# Patient Record
Sex: MALE | URBAN - METROPOLITAN AREA
[De-identification: names, ages, dates, MRNs, and addresses within clinical notes are randomized per-mention and may not be internally consistent; named-entity substitution may affect disease eponyms.]

---

## 2023-07-17 ENCOUNTER — TELEPHONE (OUTPATIENT)
Dept: NEUROSURGERY | Facility: CLINIC | Age: 23
End: 2023-07-17

## 2023-07-17 NOTE — TELEPHONE ENCOUNTER
7/17/23 RECEIVED EMAIL FROM PT REQUESTING APPT; First Name: Corinne Sarin  Last Name: Giovany Dennis  YOB: 2000  Email: [email or web address]  Phone: [phone]   Address: [id number] [place] [address] Street: [address] [place] [address] Sw: [place]  Zip: [address]  What are Your Symptoms?: Right sided numbness that has been slowly progressing to left side. Recently received 5 days of IV steroids. Do you have a neuro specialist?: Yes  If yes, who is your doctor?:     Shala Arriaza SPOKE TO PT, HE STATES HE HAD RECENT IMAGING DONE AT [address] [place] [address] REPORT FAXED AND OBTAIN [address] [place] [address]. PT [facility] Elmer Arriaza [id number] [phone]. ADVISED PT WE WOULD CALL BACK FOR INTAKE ONCE IMAGING REPORT RECEIVED.

## 2023-07-25 NOTE — TELEPHONE ENCOUNTER
7/25/23 REPORTS RECEIVED AND SCANNED INTO CHART, BOOKMARKED, AND PT HAS DISC FOR APPT. MRI BRAIN 7/6/23 SANCHEZ  MRI CSPINE 7/6/23 SANCHEZ  LUMBAR PUNCTURE 7/7/23 SANCHEZ  MRI TSPINE 7/7/23 DANIEL  CALLED AND SPOKE TO PT TO CLARIFY REASON FOR APPT REQUEST, PT STATES HE WAS LOOKING TO SCHEDULE WITH A  NEUROLOGIST, PROVIDED PT WITH NUMBER TO  NEUROLOGY  AND ADVISED I WOULD SEND MSG TO STAFF.

## 2023-10-05 ENCOUNTER — TELEPHONE (OUTPATIENT)
Dept: NEUROLOGY | Facility: CLINIC | Age: 23
End: 2023-10-05

## 2023-10-05 ENCOUNTER — OFFICE VISIT (OUTPATIENT)
Dept: NEUROLOGY | Facility: CLINIC | Age: 23
End: 2023-10-05
Payer: COMMERCIAL

## 2023-10-05 ENCOUNTER — APPOINTMENT (OUTPATIENT)
Dept: LAB | Facility: CLINIC | Age: 23
End: 2023-10-05
Payer: COMMERCIAL

## 2023-10-05 VITALS
RESPIRATION RATE: 20 BRPM | HEART RATE: 66 BPM | SYSTOLIC BLOOD PRESSURE: 110 MMHG | WEIGHT: 294 LBS | DIASTOLIC BLOOD PRESSURE: 82 MMHG | HEIGHT: 73 IN | TEMPERATURE: 97.5 F | OXYGEN SATURATION: 98 % | BODY MASS INDEX: 38.97 KG/M2

## 2023-10-05 DIAGNOSIS — G37.9 CNS DEMYELINATION (HCC): Primary | ICD-10-CM

## 2023-10-05 DIAGNOSIS — R20.2 NUMBNESS AND TINGLING IN RIGHT HAND: ICD-10-CM

## 2023-10-05 DIAGNOSIS — G35 MULTIPLE SCLEROSIS (HCC): Primary | ICD-10-CM

## 2023-10-05 DIAGNOSIS — R20.0 NUMBNESS AND TINGLING IN RIGHT HAND: ICD-10-CM

## 2023-10-05 DIAGNOSIS — G35 MS (MULTIPLE SCLEROSIS) (HCC): ICD-10-CM

## 2023-10-05 DIAGNOSIS — N31.9 NEUROGENIC BLADDER: ICD-10-CM

## 2023-10-05 LAB
HBV CORE AB SER QL: NORMAL
HBV SURFACE AB SER-ACNC: <3 MIU/ML
HBV SURFACE AG SER QL: NORMAL
IGA SERPL-MCNC: 242 MG/DL (ref 66–433)
IGG SERPL-MCNC: 1255 MG/DL (ref 635–1741)
IGM SERPL-MCNC: 75 MG/DL (ref 45–281)

## 2023-10-05 PROCEDURE — 99245 OFF/OP CONSLTJ NEW/EST HI 55: CPT | Performed by: PSYCHIATRY & NEUROLOGY

## 2023-10-05 PROCEDURE — 87522 HEPATITIS C REVRS TRNSCRPJ: CPT

## 2023-10-05 PROCEDURE — 87340 HEPATITIS B SURFACE AG IA: CPT

## 2023-10-05 PROCEDURE — 36415 COLL VENOUS BLD VENIPUNCTURE: CPT

## 2023-10-05 PROCEDURE — 86480 TB TEST CELL IMMUN MEASURE: CPT

## 2023-10-05 PROCEDURE — 87389 HIV-1 AG W/HIV-1&-2 AB AG IA: CPT

## 2023-10-05 PROCEDURE — 86706 HEP B SURFACE ANTIBODY: CPT

## 2023-10-05 PROCEDURE — 86363 MOG-IGG1 ANTB FLO CYTMTRY EA: CPT | Performed by: PSYCHIATRY & NEUROLOGY

## 2023-10-05 PROCEDURE — 86053 AQAPRN-4 ANTB FLO CYTMTRY EA: CPT

## 2023-10-05 PROCEDURE — 86711 JOHN CUNNINGHAM ANTIBODY: CPT

## 2023-10-05 PROCEDURE — 86704 HEP B CORE ANTIBODY TOTAL: CPT

## 2023-10-05 PROCEDURE — 82784 ASSAY IGA/IGD/IGG/IGM EACH: CPT

## 2023-10-05 RX ORDER — GABAPENTIN 100 MG/1
100 CAPSULE ORAL AS NEEDED
COMMUNITY
Start: 2023-07-14
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
## 2023-10-05 NOTE — PROGRESS NOTES
Review of Systems   Constitutional: Positive for fatigue (sometimes sleep 12+ hrs and still feels tired). Negative for appetite change and fever. HENT: Negative. Negative for hearing loss, tinnitus, trouble swallowing and voice change. Eyes: Negative. Negative for photophobia, pain and visual disturbance. Respiratory: Negative. Negative for shortness of breath. Cardiovascular: Negative. Negative for palpitations. Gastrointestinal: Negative. Negative for nausea and vomiting. Endocrine: Negative. Negative for cold intolerance. Genitourinary: Negative. Negative for dysuria, frequency and urgency. Musculoskeletal: Positive for gait problem (issues with balance - T25FW - 8SEC). Negative for back pain, myalgias and neck pain. Skin: Negative. Negative for rash. Allergic/Immunologic: Negative. Neurological: Positive for dizziness (randomly has dizziness - has not happened recently ), numbness (right arm/leg) and headaches (ONCE A WEEK ). Negative for tremors, seizures, syncope, facial asymmetry, speech difficulty, weakness and light-headedness. Hematological: Negative. Does not bruise/bleed easily. Psychiatric/Behavioral: Positive for confusion (sometimes ). Negative for hallucinations and sleep disturbance.
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
## 2023-10-06 ENCOUNTER — TELEPHONE (OUTPATIENT)
Dept: NEUROLOGY | Facility: CLINIC | Age: 23
End: 2023-10-06
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
## 2023-10-06 LAB
GAMMA INTERFERON BACKGROUND BLD IA-ACNC: 0.01 IU/ML
HCV RNA SERPL NAA+PROBE-ACNC: NORMAL IU/ML
HIV 1+2 AB+HIV1 P24 AG SERPL QL IA: NORMAL
HIV 2 AB SERPL QL IA: NORMAL
HIV1 AB SERPL QL IA: NORMAL
HIV1 P24 AG SERPL QL IA: NORMAL
M TB IFN-G BLD-IMP: NEGATIVE
M TB IFN-G CD4+ BCKGRND COR BLD-ACNC: 0 IU/ML
M TB IFN-G CD4+ BCKGRND COR BLD-ACNC: 0.01 IU/ML
MITOGEN IGNF BCKGRD COR BLD-ACNC: 9.99 IU/ML
TEST INFORMATION: NORMAL
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
## 2023-10-06 NOTE — LETTER
FACSIMILE TRANSMITTAL SHEET  to: [facility]  from: MIRACLE Ley          company:   date: 10/13/2023          RECIPIENT's fax number: [phone]  total no. of pages including cover:           RECIPIENT'S Phone number: ( )   sender's FAX number:           rE: Consult request  SENDER'S PHONE number: [phone]           [] Urgent  [x] For Review[] Please Comment [] Please Reply   notes/Comments:      Please review referral, could you please contact patient to schedule consult? Thank you                CONFIDENTIALITY NOTICE  This transmission is intended only for the use of the individual or entity to which it is addressed and may contain information that is privileged and confidential.  If the reader of this message is not the intended recipient, you are hereby notified that any disclosure, distribution, or copying of this information is strictly prohibited.   If you have received this transmission in error, please notify us immediately by telephone, and return the original documents to us at the above address via the [place]

## 2023-10-06 NOTE — TELEPHONE ENCOUNTER
MIRACLE phoned [address]   [address] [place] [address] Kahlil Gonsalves  Phone  [phone]  They ask their surgeons on Monday to learn if they can help patient and then will be calling this writer back.

## 2023-10-06 NOTE — TELEPHONE ENCOUNTER
Addressing surgery question in other encounter. Will f/u on Ocrevus start. Awaiting BI and final lab results.
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
## 2023-10-06 NOTE — TELEPHONE ENCOUNTER
, is there a specific type of surgeon you would recommend? Plastic surgery, general surgery, etc?     SW- are you able to assist pt in locating surgeon? Thanks!

## 2023-10-06 NOTE — LETTER
FACSIMILE TRANSMITTAL SHEET  to: [facility]  from: Rigoberto Lane, [address]:   date: 10/12/2023          RECIPIENT's fax number: [phone]  total no. of pages including cover:           RECIPIENT'S Phone number: ( )   sender's FAX number:           rE: consult  SENDER'S PHONE number: [phone]           [] Urgent  x For Review[] Please Comment [] Please Reply   notes/Comments:    Good afternoon,    Please review referral for Mr. Solis Patch [phone] requesting a consult. Thanks  Rigoberto Lane                   CONFIDENTIALITY NOTICE  This transmission is intended only for the use of the individual or entity to which it is addressed and may contain information that is privileged and confidential.  If the reader of this message is not the intended recipient, you are hereby notified that any disclosure, distribution, or copying of this information is strictly prohibited.   If you have received this transmission in error, please notify us immediately by telephone, and return the original documents to us at the above address via the [facility]
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
## 2023-10-10 NOTE — TELEPHONE ENCOUNTER
MSW phoned [address]   [facility], [facility]  Phone  [phone]  Spoke with [employer] who informed that   Jose lutz get back to this writer regarding request       MSLING had communication with Stephanie. [facility] can see pt.  They do not need a referral. Pt can call for an eval.

## 2023-10-10 NOTE — TELEPHONE ENCOUNTER
Incoming call from [address] [place] [address],  [phone]  Dr. Yenifer Nelson cannot do the surgery.

## 2023-10-11 NOTE — TELEPHONE ENCOUNTER
MSW phoned pt and he informed that he would like to know more info about how much his insurance will cover and how much is his responsibility. MSW will reach out to [address] regarding patient's question.

## 2023-10-12 NOTE — TELEPHONE ENCOUNTER
Referral faxed to [facility]   Referrals, ovn and facesheet

## 2023-10-12 NOTE — TELEPHONE ENCOUNTER
MSW phoned [address]   [address] [facility] Kahlil Gonsalves  Phone  [phone]  -spoke with Carmen Wood who shared that before they can bill patient's insurance they would have to do an initial evaluation with patient. This evaluation they would try to bill insurance and it is considered a specialty visit. If insurance does not cover the visit, then the out of pocket cost is $150. Once initial evaluation is completed, their team will reach out to insurance to obtain a quote and will reach out to patient. [id number] [place] [address] Way are located in [place] [address]. MSW phoned pt and provided this information. He also would like to know if there are other offices that are closer to his home before selecting Qiana Metzger. MSW will find other locations and will reach out to patient.
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
## 2023-10-13 NOTE — TELEPHONE ENCOUNTER
Fax to [facility] did not go through     Rosalia: One [facility], [facility], [address] [place] [address] 3  Hours: Open ? Closes 7? PM  Phone: [phone]      - MSW call to request new fax number   Fax [phone] alternative fax provided   [phone]    Referral refaxed

## 2023-10-16 LAB
GALACTOMANNAN AG SERPL IA-ACNC: 1.26
JCPYV AB SERPL QL IA: ABNORMAL
JCPYV AB SERPL QL IA: POSITIVE
SPECIMEN STATUS: ABNORMAL

## 2023-10-17 LAB — MISCELLANEOUS LAB TEST RESULT: NORMAL

## 2023-10-19 NOTE — TELEPHONE ENCOUNTER
All lab results received. BI is in process. Per [employer] portal, additional information is needed. Sent message to this team to determine what is needed. Will f/u.

## 2023-10-20 NOTE — TELEPHONE ENCOUNTER
Received response in [employer] portal:    "Dawit Garcia, we need the patients insurance information. Plan came in as [facility], and rep was unable to locate patient also stated prefix in Sub ID is for [place]. [place] also unable to locate patient -Thank you. - Angelica"    Provided copy of pt's insurance card. Coverage e-verified in epic as of earlier this month. Awaiting update.

## 2023-10-27 PROBLEM — G35 MULTIPLE SCLEROSIS (HCC): Status: ACTIVE | Noted: 2023-10-05

## 2023-10-27 NOTE — TELEPHONE ENCOUNTER
Therapy plan entered and sent for signature. If PA is approved, pt just needs scheduling. Awaiting determination.

## 2023-10-27 NOTE — TELEPHONE ENCOUNTER
Working on entering therapy plan. Please add diagnosis of MS to pt's chart for billing purposes. Thank you! Still awaiting PA determination.

## 2023-10-30 DIAGNOSIS — G35 MULTIPLE SCLEROSIS (HCC): Primary | ICD-10-CM

## 2023-10-30 RX ORDER — SODIUM CHLORIDE 9 MG/ML
20 INJECTION, SOLUTION INTRAVENOUS ONCE
OUTPATIENT
Start: 2023-11-15

## 2023-10-30 RX ORDER — ACETAMINOPHEN 325 MG/1
650 TABLET ORAL ONCE
OUTPATIENT
Start: 2023-11-15

## 2023-11-09 NOTE — TELEPHONE ENCOUNTER
Therapy plan is signed. Received fax from [facility]- request is managed by [employer]. Request to be faxed to [phone]. Phone # [phone]. Request faxed.  Awaiting determination

## 2023-11-16 NOTE — TELEPHONE ENCOUNTER
Called [employer] at [phone] and spoke with Pacov. She reports Ocrevus PA is still under review. Review may take up to 15 days. States nothing further is needed from our office. Awaiting determination.

## 2023-11-16 NOTE — TELEPHONE ENCOUNTER
Jaun Amos [facility])    Edited November 8, 2023 at 1:17 PM  @Radha Valencia (Customer) Diana Monzon, as a follow up to this patient. We have made multiple attempts to reach out to him, but he always hangs up on us. Can someone reach out to him to let him know that we're calling or can you provide his number to us so he can give us a call back? Thank you! Will notify pt to contact [employer]spinela if PA is approved.

## 2023-11-21 NOTE — TELEPHONE ENCOUNTER
Forwarded the following voicemail: [facility], my name is Ignacia. I'm calling with MBM prior authorizations on the line that may be monitored or recorded. This is a message for Radha about patient Dixon Chen and request #[id number] for Yohan Ness. I'm calling because this is a site of care drug. It should be given at a lower level of care, such as an office, freestanding infusion suite or home settings. There are a few options. The 1st being cancelling the request and selecting a different location for treatment. The 2nd option is a peer to peer, which may be scheduled by calling [phone], with the request number. Or the 3rd option, is a standard medical director review. You would have your decision by November 24th. Please call me at [phone], extension [phone]. And let me know how your provider would like to proceed. My voicemail is confidential. So if I miss your call, please leave a detailed message with any questions or update.”    Left detailed message for Igncaia advising pt is not established on Ocrevus- this is a new start. Pt must have initial 2 doses in hospital outpatient setting due to risk of reaction. If tolerated well, can switch site of care for future infusions. Requested medical director review with the provided information.

## 2023-11-24 NOTE — TELEPHONE ENCOUNTER
received vm from 11/21 at 2:18pm-Hi, this is Josiane with MBM authorizations on a line that may be monitored or recorded. Calling back for Pushpa Mandujano about patient Harshad Duggan, date of birth 2000. This is about pending request [id number]. I reached out because this medication is a site of care drug. It should be given at a lower level of care such as an office, freestanding infusion suite or home setting. Your provider has the option to schedule a peer to peer by calling [phone], using that request number. Otherwise I am forwarding it to the medical directors for review and you'll have your decision by november 24th. Um, the other option is to cancel and select a different location for treatment. My callback number is [phone], extension [phone]. Please return my call and let me know how your provider would like to proceed.  Thank you.  ---------------------------------------------  Radha left  for Ignacia after Josiane left this message

## 2023-11-27 NOTE — TELEPHONE ENCOUNTER
VM 11/24/23 at 2:18 pm:    Renetta, this is Josiane from [facility] prior authorizations on the line that may be monitored or recorded. Calling for Marianne Rojas about patient Gilmar Mccracken. Date of birth 2000. Request, [id number] is not approved due to (((inaudible))) of care. This medication may be safely given at an office,freestanding infusions with home studying, essentially just a lower level of care than the outpatient facility provides. Your office will receive a letter with denial information and appeal rights. If you have questions, please call [phone]. There's also the option for a post denial peer to peer which may be completed within the next 14 days. The scheduling phone number is [phone]. And you will need the request number when calling.

## 2023-11-27 NOTE — TELEPHONE ENCOUNTER
Per below, Chris Saenz has been denied due to site of care. Insurance requires pt to go to freestanding site or have home infusion. Pt is new to therapy. Did already explain that initiation of therapy must take place in hospital outpatient setting due to risks of infusion reaction. Denial was still received. Will review appeal options once letter is received. May need to contact plan if letter is not received soon to allow time for peer to peer.

## 2023-11-29 NOTE — TELEPHONE ENCOUNTER
Called TRES at [phone] and spoke with [facility] [place]. She provided phone # [phone] for appeals. Called this number, reached [facility]. Spoke with Tobi Zapata. She reports appeal must be faxed to [phone] attn pharmacy appeals.      Will submit appeal.

## 2023-12-05 ENCOUNTER — TELEPHONE (OUTPATIENT)
Dept: NEUROLOGY | Facility: CLINIC | Age: 23
End: 2023-12-05

## 2023-12-05 NOTE — TELEPHONE ENCOUNTER
Patient was a [facility] for his VV today - please offer in-office visit with me or Fatuma.

## 2023-12-05 NOTE — TELEPHONE ENCOUNTER
Mayra Pena, SW5 days ago     YO     Hi, my name is Guerrero Blackburn. I am Jaret viveros. I know he restarted the process of him getting Ms. treatment for you guys. And then I thought Alicia Rosales was scheduling things and getting things scheduled. He wasn't. So I'm working on getting things scheduled for him. I talked to the plastic surgeons through [address] [place] [address] and I was just kind of reaching out to see what else needed to be done. So if you could give me a call back, my number is [phone]. Thank you.

## 2023-12-05 NOTE — TELEPHONE ENCOUNTER
Called [facility] at [phone] and spoke with Carlito Underwood. He reports appeal has been approved:    Kp Ferrari is approved with [facility] from 11/9/23 to 11/9/24 at \[facility]\"" infusion center. Auth # [id number]. Called \[facility]\"" infusion center and spoke with Zuleyka Hernandez. Scheduled pt for first available Ocrevus appts:    1/4/24 @ 7:30am  1/19/24 @ 7:30am (1 day delay to allow for sooner availability)    Left voicemail for pt's jordan Turner (on communication consent) per below. Called and spoke with pt. Made him aware of approval and scheduled appts. He is agreeable. Did also advise labs would be needed 1 week prior to first infusion. Will contact pt with reminder closer to that date. Provided phone # for [address] so pt can enroll in copay assistance if needed. Pt aware to call back with any questions.

## 2023-12-07 NOTE — TELEPHONE ENCOUNTER
Spoke with Moy Day. Confirmed pt can have home infusion or go to closer free standing infusion site after initial split dose if tolerated well. They would be interested in home infusion- unsure if there are any freestanding infusion centers close to pt. She reports pt will have labs one week prior to infusion at local lab [facility]). She would like lab scripts sent to pt in [facility] message. The following labs are required and have been entered as appropriate per protocol:    CBC  CMP    Immunoglobulins done 10/5/23  HIV done 10/5/23  Quantiferon TB gold done 10/5/23  Hepatitis panel done 10/5/23    New CBC and CMP orders entered so they are available on same script to avoid errors at lab. [employer] message sent.

## 2023-12-07 NOTE — TELEPHONE ENCOUNTER
Received message from [address]:    "Received this message 12/5/23 at 12:41. I was not able to pull up the chart. Hi, my name is Ari Ac. I got a message this morning from [address] [place] [address] regarding my jackeline Chaudhry Plan. And he told me that he got in touch with you guys this morning. But I was just wondering for his Ocrevus infusion. I know when we talked to the doctor, she said about the 1st one being at [facility] and then the following one possibly getting them set up that he could receive them closer to where we live since he live so far away. And I was also wondering about where specifically he needs to get the blood work done on December 28th. So if you could give me a call back and let me know these things. Um, if I don't answer, it's okay to just leave it in my voicemail. I know I am available until like 3 o'clock today. Then I'll be in appointments through work and tomorrow I'll be available after 2. But if you Margarett Epley just leave me a voicemail that works to my phone number is [phone].  Thank you."

## 2023-12-19 ENCOUNTER — TELEPHONE (OUTPATIENT)
Dept: NEUROLOGY | Facility: CLINIC | Age: 23
End: 2023-12-19

## 2023-12-19 DIAGNOSIS — G35 MULTIPLE SCLEROSIS (HCC): Primary | ICD-10-CM

## 2023-12-19 NOTE — TELEPHONE ENCOUNTER
Pt is scheduled for Ocrevus on 1/4/24.     [facility]24 at [facility]. Auth # [id number].     The following labs are required and have been entered as appropriate per protocol:    CBC  CMP    Immunoglobulins done 10/5/23  HIV done 10/5/23  Quantiferon TB gold done 10/5/23  Hepatitis panel done 10/5/23    MyChart message sent to pt reminding them of needed labs.    Awaiting labs.

## 2023-12-21 NOTE — TELEPHONE ENCOUNTER
"Per prior encounter, \"Spoke with Trista. Confirmed pt can have home infusion or go to closer free standing infusion site after initial split dose if tolerated well. They would be interested in home infusion- unsure if there are any freestanding infusion centers close to pt.      She reports pt will have labs one week prior to infusion at local lab ([facility]). She would like lab scripts sent to pt in [facility] message.      The following labs are required and have been entered as appropriate per protocol:     CBC  CMP     Immunoglobulins done 10/5/23  HIV done 10/5/23  Quantiferon TB gold done 10/5/23  Hepatitis panel done 10/5/23     New CBC and CMP orders entered so they are available on same script to avoid errors at lab.     [employer] message sent.\"    Will call with another reminder closer to infusion as [facility] message was not read.  "

## 2023-12-22 ENCOUNTER — TELEPHONE (OUTPATIENT)
Dept: NEUROLOGY | Facility: CLINIC | Age: 23
End: 2023-12-22

## 2023-12-22 NOTE — TELEPHONE ENCOUNTER
VM at 8:30 am:    Hi my name is Trista Carlos. I'm calling regarding my fiance, Hunter Lee. He was just showing me last night his test results from when he got blood work done back in October. 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 So if somebody could give me a call back, I would really appreciate it. And again, my number is [phone]. Thank you.  _____________________________________________________________________________    Trista is listed as a contact on pt's most recent Medical Communication Consent Form. Routed to clinical team to follow up.

## 2023-12-29 NOTE — TELEPHONE ENCOUNTER
CBC and CMP results in chart.     No changes since last office visit.     Okay to proceed with 1st Ocrevus infusion on 1/4/24?

## 2024-01-04 ENCOUNTER — HOSPITAL ENCOUNTER (OUTPATIENT)
Dept: INFUSION CENTER | Facility: HOSPITAL | Age: 24
Discharge: HOME | End: 2024-01-04
Attending: PSYCHIATRY & NEUROLOGY
Payer: COMMERCIAL

## 2024-01-04 ENCOUNTER — TELEPHONE (OUTPATIENT)
Dept: NEUROLOGY | Facility: CLINIC | Age: 24
End: 2024-01-04

## 2024-01-04 VITALS
TEMPERATURE: 97.2 F | RESPIRATION RATE: 18 BRPM | SYSTOLIC BLOOD PRESSURE: 116 MMHG | DIASTOLIC BLOOD PRESSURE: 63 MMHG | HEART RATE: 88 BPM

## 2024-01-04 DIAGNOSIS — G35 MULTIPLE SCLEROSIS (HCC): Primary | ICD-10-CM

## 2024-01-04 PROCEDURE — 96413 CHEMO IV INFUSION 1 HR: CPT

## 2024-01-04 PROCEDURE — 96415 CHEMO IV INFUSION ADDL HR: CPT

## 2024-01-04 PROCEDURE — 96375 TX/PRO/DX INJ NEW DRUG ADDON: CPT

## 2024-01-04 PROCEDURE — 96361 HYDRATE IV INFUSION ADD-ON: CPT

## 2024-01-04 PROCEDURE — 96376 TX/PRO/DX INJ SAME DRUG ADON: CPT

## 2024-01-04 PROCEDURE — 96367 TX/PROPH/DG ADDL SEQ IV INF: CPT

## 2024-01-04 RX ORDER — ACETAMINOPHEN 325 MG/1
650 TABLET ORAL ONCE
OUTPATIENT
Start: 2024-01-19

## 2024-01-04 RX ORDER — SODIUM CHLORIDE 9 MG/ML
20 INJECTION, SOLUTION INTRAVENOUS ONCE
Status: COMPLETED | OUTPATIENT
Start: 2024-01-04 | End: 2024-01-04

## 2024-01-04 RX ORDER — ACETAMINOPHEN 325 MG/1
650 TABLET ORAL ONCE
OUTPATIENT
Start: 2024-01-18

## 2024-01-04 RX ORDER — SODIUM CHLORIDE 9 MG/ML
20 INJECTION, SOLUTION INTRAVENOUS ONCE
OUTPATIENT
Start: 2024-01-18

## 2024-01-04 RX ORDER — METHYLPREDNISOLONE SODIUM SUCCINATE 40 MG/ML
10 INJECTION, POWDER, LYOPHILIZED, FOR SOLUTION INTRAMUSCULAR; INTRAVENOUS
Status: ACTIVE | OUTPATIENT
Start: 2024-01-04 | End: 2024-01-04

## 2024-01-04 RX ORDER — SODIUM CHLORIDE 9 MG/ML
20 INJECTION, SOLUTION INTRAVENOUS ONCE
OUTPATIENT
Start: 2024-01-19

## 2024-01-04 RX ORDER — ACETAMINOPHEN 325 MG/1
650 TABLET ORAL ONCE
Status: COMPLETED | OUTPATIENT
Start: 2024-01-04 | End: 2024-01-04

## 2024-01-04 RX ORDER — FAMOTIDINE 10 MG/ML
20 INJECTION, SOLUTION INTRAVENOUS ONCE
Status: COMPLETED | OUTPATIENT
Start: 2024-01-04 | End: 2024-01-04

## 2024-01-04 RX ORDER — DIPHENHYDRAMINE HYDROCHLORIDE 50 MG/ML
25 INJECTION INTRAMUSCULAR; INTRAVENOUS
Status: ACTIVE | OUTPATIENT
Start: 2024-01-04 | End: 2024-01-04

## 2024-01-04 RX ADMIN — ACETAMINOPHEN 650 MG: 325 TABLET, FILM COATED ORAL at 08:13

## 2024-01-04 RX ADMIN — FAMOTIDINE 20 MG: 10 INJECTION INTRAVENOUS at 07:49

## 2024-01-04 RX ADMIN — SODIUM CHLORIDE 125 MG: 0.9 INJECTION, SOLUTION INTRAVENOUS at 09:00

## 2024-01-04 RX ADMIN — HYDROCORTISONE SODIUM SUCCINATE 100 MG: 100 INJECTION, POWDER, FOR SOLUTION INTRAMUSCULAR; INTRAVENOUS at 08:58

## 2024-01-04 RX ADMIN — FAMOTIDINE 20 MG: 10 INJECTION INTRAVENOUS at 12:27

## 2024-01-04 RX ADMIN — DIPHENHYDRAMINE HYDROCHLORIDE 25 MG: 50 INJECTION, SOLUTION INTRAMUSCULAR; INTRAVENOUS at 12:23

## 2024-01-04 RX ADMIN — SODIUM CHLORIDE 500 ML: 0.9 INJECTION, SOLUTION INTRAVENOUS at 12:22

## 2024-01-04 RX ADMIN — SODIUM CHLORIDE 20 ML/HR: 0.9 INJECTION, SOLUTION INTRAVENOUS at 07:49

## 2024-01-04 RX ADMIN — DIPHENHYDRAMINE HYDROCHLORIDE 50 MG: 50 INJECTION, SOLUTION INTRAMUSCULAR; INTRAVENOUS at 08:13

## 2024-01-04 RX ADMIN — METHYLPREDNISOLONE SODIUM SUCCINATE 10 MG: 40 INJECTION, POWDER, FOR SOLUTION INTRAMUSCULAR; INTRAVENOUS at 12:25

## 2024-01-04 RX ADMIN — OCRELIZUMAB 300 MG: 300 INJECTION INTRAVENOUS at 09:42

## 2024-01-04 NOTE — TELEPHONE ENCOUNTER
01-3-2024, 11:26:08 am EST    Voicemail received from patietns wife Trista regarding labs. Wanted to make sure labs were received and reviewed prior to ocrevus infusion on 1/4/2024 as labs were abnormal.    [phone]

## 2024-01-04 NOTE — TELEPHONE ENCOUNTER
"Received the following messages regarding the patient:    [12:32 PM] Christina Page  he was on the last titration rate of 180ml/hr with 54mL left when he told me he has generalized itching since about 'custodial thru the bag'  [12:33 PM] Christina Page  hypersensitivity kit given....complete resolution of symptoms immediately. can we get an ok to finish?   [12:35 PM] Christina Page  vital signs no different than what they were on arrival    TT sent to Dr. Méndez.     Per Dr. Méndez,\"Yes, please proceed with infusion at a slower rate\"    Confirmed with  infusion to be resumed at 150mL/hr for remainder of infusion.    Order entered in therapy plan.    Infusion RN made aware.   "

## 2024-01-04 NOTE — TELEPHONE ENCOUNTER
December 29, 2023  Radha Perez RN         12/29/23  1:56 PM  Note     CBC and CMP results in chart.      No changes since last office visit.      Okay to proceed with 1st Ocrevus infusion on 1/4/24?             LD    12/29/23  1:56 PM  Radha Perez RN routed this conversation to MD Kenna Garcia MD   to Radha Perez RN       12/29/23  2:36 PM  Note     Patient is cleared for infusion as scheduled - thank you, Radha!

## 2024-01-04 NOTE — PROGRESS NOTES
Infusion complete. Pt c/o generalized itching with 15 minutes left of infusion(which he later admitted actually started half way through the infusion). Infusion stopped and hypersensitivity kit given as reflected in the MAR. Pt had immediate relief and offered no further complaints. VSS. OK to finish infusion order obtained by office at rate of 150ml/hr. Pt completed infusion without further incident. VSS post infusion. 1 hour observation complete.       Hunter Lee is aware of future appt on 1/19/24 at 0730.     AVS Declined by Hunter Lee

## 2024-01-05 ENCOUNTER — TELEPHONE (OUTPATIENT)
Dept: NEUROLOGY | Facility: CLINIC | Age: 24
End: 2024-01-05

## 2024-01-05 NOTE — TELEPHONE ENCOUNTER
Pt is scheduled for 2nd Ocrevus infusion on 1/19/24.      Ocrevus is approved with [facility] from 11/9/23 to 11/9/24 at \[facility]"" infusion center. Auth # [id number].      CBC done 12/28/23  CMP done 12/28/23  Immunoglobulins done 10/5/23  HIV done 10/5/23  Quantiferon TB gold done 10/5/23  Hepatitis panel done 10/5/23     Pt received hypersensitivity kit for generalized itching during first infusion- pt with immediate relief, no further complaints. VSS. Infusion resumed at 150ml/hr. No further reactions.     Okay to proceed with 2nd Ocrevus infusion on 1/19/24?

## 2024-01-19 ENCOUNTER — HOSPITAL ENCOUNTER (OUTPATIENT)
Dept: INFUSION CENTER | Facility: HOSPITAL | Age: 24
Discharge: HOME | End: 2024-01-19
Attending: PSYCHIATRY & NEUROLOGY

## 2024-02-01 ENCOUNTER — HOSPITAL ENCOUNTER (OUTPATIENT)
Dept: INFUSION CENTER | Facility: HOSPITAL | Age: 24
Discharge: HOME | End: 2024-02-01
Attending: PSYCHIATRY & NEUROLOGY
Payer: COMMERCIAL

## 2024-02-01 VITALS
TEMPERATURE: 97.8 F | HEART RATE: 93 BPM | DIASTOLIC BLOOD PRESSURE: 82 MMHG | SYSTOLIC BLOOD PRESSURE: 133 MMHG | RESPIRATION RATE: 18 BRPM

## 2024-02-01 DIAGNOSIS — G35 MULTIPLE SCLEROSIS (HCC): Primary | ICD-10-CM

## 2024-02-01 PROCEDURE — 96413 CHEMO IV INFUSION 1 HR: CPT

## 2024-02-01 PROCEDURE — 96375 TX/PRO/DX INJ NEW DRUG ADDON: CPT

## 2024-02-01 PROCEDURE — 96367 TX/PROPH/DG ADDL SEQ IV INF: CPT

## 2024-02-01 PROCEDURE — 96415 CHEMO IV INFUSION ADDL HR: CPT

## 2024-02-01 RX ORDER — ACETAMINOPHEN 325 MG/1
650 TABLET ORAL ONCE
Status: COMPLETED | OUTPATIENT
Start: 2024-02-01 | End: 2024-02-01

## 2024-02-01 RX ORDER — ACETAMINOPHEN 325 MG/1
650 TABLET ORAL ONCE
OUTPATIENT
Start: 2024-02-15

## 2024-02-01 RX ORDER — ACETAMINOPHEN 325 MG/1
650 TABLET ORAL ONCE
Status: CANCELLED | OUTPATIENT
Start: 2024-02-01

## 2024-02-01 RX ORDER — ACETAMINOPHEN 325 MG/1
650 TABLET ORAL ONCE
Status: CANCELLED | OUTPATIENT
Start: 2024-02-15

## 2024-02-01 RX ORDER — SODIUM CHLORIDE 9 MG/ML
20 INJECTION, SOLUTION INTRAVENOUS ONCE
Status: COMPLETED | OUTPATIENT
Start: 2024-02-01 | End: 2024-02-01

## 2024-02-01 RX ORDER — SODIUM CHLORIDE 9 MG/ML
20 INJECTION, SOLUTION INTRAVENOUS ONCE
OUTPATIENT
Start: 2024-02-15

## 2024-02-01 RX ORDER — SODIUM CHLORIDE 9 MG/ML
20 INJECTION, SOLUTION INTRAVENOUS ONCE
Status: CANCELLED | OUTPATIENT
Start: 2024-02-01

## 2024-02-01 RX ORDER — SODIUM CHLORIDE 9 MG/ML
20 INJECTION, SOLUTION INTRAVENOUS ONCE
Status: CANCELLED | OUTPATIENT
Start: 2024-02-15

## 2024-02-01 RX ADMIN — FAMOTIDINE 20 MG: 10 INJECTION INTRAVENOUS at 09:50

## 2024-02-01 RX ADMIN — HYDROCORTISONE SODIUM SUCCINATE 100 MG: 100 INJECTION, POWDER, FOR SOLUTION INTRAMUSCULAR; INTRAVENOUS at 10:19

## 2024-02-01 RX ADMIN — ACETAMINOPHEN 650 MG: 325 TABLET, FILM COATED ORAL at 10:18

## 2024-02-01 RX ADMIN — DIPHENHYDRAMINE HYDROCHLORIDE 50 MG: 50 INJECTION, SOLUTION INTRAMUSCULAR; INTRAVENOUS at 09:23

## 2024-02-01 RX ADMIN — OCRELIZUMAB 300 MG: 300 INJECTION INTRAVENOUS at 11:26

## 2024-02-01 RX ADMIN — SODIUM CHLORIDE 125 MG: 0.9 INJECTION, SOLUTION INTRAVENOUS at 10:19

## 2024-02-01 RX ADMIN — SODIUM CHLORIDE 20 ML/HR: 0.9 INJECTION, SOLUTION INTRAVENOUS at 09:23

## 2024-02-01 NOTE — PROGRESS NOTES
Hunter Lee  tolerated treatment well with no complications.  Post observation completed.     Hunter Lee is aware of future appt on 7/1/24 at 0730.     AVS declined by Hunter Lee.

## 2024-05-01 ENCOUNTER — PATIENT MESSAGE (OUTPATIENT)
Dept: NEUROLOGY | Facility: CLINIC | Age: 24
End: 2024-05-01

## 2024-05-09 ENCOUNTER — TELEPHONE (OUTPATIENT)
Dept: NEUROLOGY | Facility: CLINIC | Age: 24
End: 2024-05-09

## 2024-05-09 NOTE — TELEPHONE ENCOUNTER
----- Message from Kenna Méndez MD sent at 5/7/2024  7:39 PM EDT -----  Regarding: FW: Next infusion  Contact: [phone]  Yes, we will continue regimen at the other site covered by insurance   ----- Message -----  From: Radha Perez RN  Sent: 5/7/2024   3:57 PM EDT  To: Kenna Méndez MD  Subject: FW: Next infusion                                1st Ocrevus infusion had itching. Tolerated  2nd infusion with no complications. Due 7/1/24. Okay to transition to home infusion/site closer to pt?  ----- Message -----  From: Jennifer Woody RN  Sent: 5/2/2024   8:32 AM EDT  To: Radha Perez RN  Subject: FW: Next infusion                                  ----- Message -----  From: Vicky Posadas RN  Sent: 5/2/2024   6:11 AM EDT  To: Neurology [place] Clinical Team 3  Subject: FW: Next infusion                                  ----- Message -----  From: Hunter Lee  Sent: 5/1/2024   3:11 PM EDT  To: [facility] Clinical  Subject: Next infusion                                    Hello, When we discussed my plan of action for my infusions it was mentioned that after the first infusion I could receive future infusions either at home or at a hospital closer to where I live. I was reaching out to see if that was still an option, and if so what is the process so I can hopefully get that started.   Thank you

## 2024-05-09 NOTE — TELEPHONE ENCOUNTER
Unable to locate any infusion centers close to patient. Could attempt to move forward with [facility] or [facility] home infusion if patient is agreeable.     [employer] message sent back to pt to confirm if he is agreeable to home infusion.     [facility]  [phone] (fax)  [phone] (phone, option 3)    [facility]  P: [phone]  F: [phone]    Infusion order written and awaiting provider signature.

## 2024-05-14 NOTE — TELEPHONE ENCOUNTER
Located phone # [phone] for [facility] ([facility] infusion services. Will call to determine if they accept pts from other networks and if they administer Ocrevus.

## 2024-05-14 NOTE — TELEPHONE ENCOUNTER
May 14, 2024  Hunter Lee   to Me   NM      5/14/24  1:34 PM  I know the closest infusion center to me would be at the [facility] in [facility], but if you are unable to transfer the infusion to the because they are a different hospital system I can do the at home infusions.

## 2024-05-14 NOTE — TELEPHONE ENCOUNTER
Called [facility] at [phone] and spoke with Ivon. Infusion center is closed. Will need to call them at [phone].

## 2024-05-16 NOTE — TELEPHONE ENCOUNTER
Called and spoke with Candy at [facility]. She confirms they do accept infusion patients from outside their network. They do infuse Ocrevus. They handle their own PA requests as well. She will fax orders to [phone]. Awaiting infusion orders for completion.     Update sent to pt via [employer].

## 2024-05-17 NOTE — TELEPHONE ENCOUNTER
Order received and completed/ updated. Uploaded to chart. Please assist with signature. Once signed, please scan into chart.     , please review. Please sign and date each page. Please initial any orders that have been crossed out/modified.     Fax # [phone]    Will send referral once infusion order is signed/initialed.

## 2024-05-23 ENCOUNTER — PATIENT MESSAGE (OUTPATIENT)
Dept: NEUROLOGY | Facility: CLINIC | Age: 24
End: 2024-05-23

## 2024-05-23 NOTE — PATIENT COMMUNICATION
Crystal from [facility] called wants to know why patient needs infusion. She received the orders but no information. She would need the last office notes to call the insurance.  She also states there is another location closer to patient home in [place], [place] about 30 mins away. Please call Crystal with the info.    Crystal- [phone]

## 2024-05-28 NOTE — TELEPHONE ENCOUNTER
Les Finley         5/23/24 10:42 AM  Unsigned Note     Crystal from [facility] center called wants to know why patient needs infusion. She received the orders but no information. She would need the last office notes to call the insurance.  She also states there is another location closer to patient home in [place], [place] about 30 mins away. Please call Crystal with the info.     Crystal- [phone]          Completed infusion referral faxed to [facility]. Will f/u to ensure pt gets scheduled. Due for next infusion 7/1/24.     Pt does need f/u appt as well. [employer] message sent.

## 2024-05-30 NOTE — TELEPHONE ENCOUNTER
Called [facility] at [phone] and spoke with Poonam. Crystal is out of office this week. Will call back.     Staff message sent to scheduling team to assist with f/u appt.

## 2024-07-01 ENCOUNTER — TELEPHONE (OUTPATIENT)
Age: 24
End: 2024-07-01

## 2024-07-01 NOTE — TELEPHONE ENCOUNTER
"--Patient's fiance, Trista called in on behalf of patient. Okay to speak with Trista per communication form. Trista wanted to clarify if there was any update on patient's insurance for his infusion. Pt was to get his infusion today. Stated that the infusion was transferred to an infusion center closer to pt's house. Trista stated that the infusion center said to call once pt received word that the his infusion was approved by insurance.     Per telephone encounter dated 5/9/24, Nurse Navigator contacted Rose:  \"Called [facility] at [phone] and spoke with Yennifer RUSSELL. She reports PA is still under review. Advised multiple calls were made requesting urgent review of PA. Yennifer reached out to nurse assigned to pt's case. She transferred call to nurse Gerber.      She reports she is unable to approve- case was previously approved via appeal. She forwarded to medical director noting prior approval received. She is aware of facility change as well. Determination is due by tomorrow night 8pm.    Will continue to f/u.\"        --Informed Trista of the message above and stated that one of the nurse navigator's will contact her to discuss further. Trista was thankful for the call back.    Please call Trista back at [phone] to discuss, okay per communication form. Pt is currently at work and not able to answer the phone.   "

## 2024-07-02 ENCOUNTER — TELEPHONE (OUTPATIENT)
Dept: NEUROLOGY | Facility: CLINIC | Age: 24
End: 2024-07-02

## 2024-07-02 DIAGNOSIS — Z11.59 ENCOUNTER FOR SCREENING FOR VIRAL DISEASE: ICD-10-CM

## 2024-07-02 DIAGNOSIS — Z11.1 TUBERCULOSIS SCREENING: ICD-10-CM

## 2024-07-02 DIAGNOSIS — Z79.899 IMMUNODEFICIENCY DUE TO TREATMENT WITH IMMUNOSUPPRESSIVE MEDICATION  (HCC): ICD-10-CM

## 2024-07-02 DIAGNOSIS — D84.821 IMMUNODEFICIENCY DUE TO TREATMENT WITH IMMUNOSUPPRESSIVE MEDICATION  (HCC): ICD-10-CM

## 2024-07-02 DIAGNOSIS — G35 MULTIPLE SCLEROSIS (HCC): Primary | ICD-10-CM

## 2024-07-02 NOTE — TELEPHONE ENCOUNTER
Pt is being scheduled for Ocrevus infusion at [facility]. Was due for infusion 7/1/24.      Ocrevus is approved with [employer] via [employer] from 6/28/24 to 6/28/25. Auth # [id number].     The following labs are required and have been entered as appropriate per protocol:    CBC  CMP  Immunoglobulins  HIV  Quantiferon TB gold  Acute hepatitis panel    Left message for Trista to call back (on communication consent).     Left detailed message for pt as well making him aware of needed labs 7 days prior to scheduled infusion appt. Asked for call back to confirm infusion appt.    [employer] message also sent.

## 2024-07-02 NOTE — TELEPHONE ENCOUNTER
Spoke with Trista. Pt is scheduled for Ocrevus infusion 7/9/24. Made her aware of below. She reports pt will have labs completed today or tomorrow [facility].    Called [facility] at [phone]. Obtained fax # [phone]. Faxed lab scripts successfully.     Awaiting lab results.
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
## 2024-07-05 NOTE — TELEPHONE ENCOUNTER
All lab results except quantiferon TB received through care everywhere. Called [facility]- they confirmed Quantiferon TB is still in process.

## 2024-07-05 NOTE — TELEPHONE ENCOUNTER
Received voicemail from Trista stating [facility] did not receive lab orders.   [phone]    Nurse navigator was out of office when voicemail and [employer] message were sent.     Lab scripts uploaded to chart and faxed to lab again. Sent via [employer] as well.

## 2024-07-05 NOTE — TELEPHONE ENCOUNTER
Spoke with Trista. She reports she was able to locate lab scripts on [employer] [facility] [employer]. Pt had labs completed on Wednesday. Waiting for results.

## 2024-07-08 NOTE — TELEPHONE ENCOUNTER
TB is negative.     Please advise if pt is cleared to proceed with Ocrevus infusion tomorrow 7/9/24 at [facility]

## 2024-08-15 ENCOUNTER — TELEPHONE (OUTPATIENT)
Dept: NEUROLOGY | Facility: CLINIC | Age: 24
End: 2024-08-15

## 2024-08-15 ENCOUNTER — TELEMEDICINE (OUTPATIENT)
Dept: NEUROLOGY | Facility: CLINIC | Age: 24
End: 2024-08-15
Payer: COMMERCIAL

## 2024-08-15 DIAGNOSIS — G35 MULTIPLE SCLEROSIS (HCC): Primary | ICD-10-CM

## 2024-08-15 DIAGNOSIS — N31.9 NEUROGENIC BLADDER: ICD-10-CM

## 2024-08-15 PROCEDURE — 99214 OFFICE O/P EST MOD 30 MIN: CPT | Performed by: PSYCHIATRY & NEUROLOGY

## 2024-08-15 NOTE — TELEPHONE ENCOUNTER
Left message for patient to call back and Please schedule 6 months FU with Dr YVONNE carrington VV and 12 months FU  in the office

## 2024-08-15 NOTE — PROGRESS NOTES
Virtual Regular Visit  Name: Hunter Lee      : 2000      MRN: [id number]  Encounter Provider: Kenna Méndez MD  Encounter Date: 8/15/2024   Encounter department: [facility]    Verification of patient location:    Patient is located at Home in the following state in which I hold an active license [place]    Assessment & Plan   1. Multiple sclerosis (HCC)  -     ocrelizumab 300 MG/10ML SOLN; Inject 20 mL (600 mg total) into a catheter in a vein once for 1 dose  -     MRI brain MS wo and w contrast; Future; Expected date: 11/15/2024  -     BUN; Future  -     Creatinine, serum; Future  2. Neurogenic bladder  -     ocrelizumab 300 MG/10ML SOLN; Inject 20 mL (600 mg total) into a catheter in a vein once for 1 dose  -     MRI brain MS wo and w contrast; Future; Expected date: 11/15/2024      Mr. Lee has presented to [facility] multiple sclerosis center for follow-up on multiple sclerosis and related concerns.  Patient was diagnosed with multiple sclerosis in  with no new focal neurological deficit reported since that time as patient has reached clinical stability with great improvement in sensory dysfunction described in his hands patient completed.    Patient completed first split dose of ocrelizumab on  and 2024 and full dose of ocrelizumab followed on 2024.  Patient tolerated medication well with mild allergic reaction described during his initial infusion.    We discussed patient may experiencing pseudo relapse as he is working in hot environment  -patient may apply cool towels or cooling vest which can help the patient to preserve coolness.  Patient taking lots of water and he should use some electrolytes.  Patient drinks green tea and using turmeric pills; patient is to continue vitamin D supplements.    Patient has intermittent lower back pain where he takes gabapentin with excellent outcomes.  Patient may introduce magnesium 400 mg  to his regimen to help with the muscle stiffness in his lower back.  Patient followed with ophthalmology team with no    Signs of breakthrough optic nerve dysfunction advised.  Intermittent balance dysfunction has been noted.  NMO/MOG negative.    Patient describes concentration difficulties and worsening forgetfulness as well as fatigue-patient will be offered MRI brain with without contrast under multiple sclerosis protocol as a follow-up study within the next 2-3 months.  Patient would definitely require a work accommodation and avoid hot environment, patient agreed he is in the middle of his career and he would like to continue what he likes at this point.    Patient is to continue following with [facility] neurology within 6 months, virtual visit advised.    Encounter provider Kenna Méndez MD    The patient was identified by name and date of birth. Hunter Lee was informed that this is a telemedicine visit and that the visit is being conducted through the Epic Embedded platform. He agrees to proceed..  My office door was closed. No one else was in the room.  He acknowledged consent and understanding of privacy and security of the video platform. The patient has agreed to participate and understands they can discontinue the visit at any time.    Patient is aware this is a billable service.     History of Present Illness     Hunter Lee is a 23 y.o. male who presents for follow up on MS   Mr. Lee has presented to [facility] multiple sclerosis center for evaluation.  Patient presented with his fiancée (they have a 12-month-old son).    Since last office visit, patient described gradual improvement in sensory dysfunction in his hands with complete resolution of numbness and tingling in his left hand and only residual sensory dysfunction in his right hand fingertips.  Patient stated he has no significant visual dysfunction, no double vision, no vision loss no significant changes in prescription  glasses.  Patient continue describing fatigue with intermittent disbalance and forgetfulness.  No sensory dysfunction in his face been noted at this time.    Patient was originally hospitalized in July 2023 for sensory dysfunction involving right upper extremity with heaviness and right lower extremity in addition to right side of the face numbness.  Patient received 5 doses of Solu-Medrol 1000 mg with near complete resolution of sensory dysfunction in the face and lower extremity noted.  Patient had split dose completed on January 4, 2024 and February 1, 2024.  Minimal infusion reaction was reported at that time.  Patient did not have any infusion reaction on repeated treatment session on July 1, 2024.  Patient has been receiving his infusion at [facility].       Patient has family history of multiple sclerosis patient's mother carries diagnosis of relapsing remitting multiple sclerosis.     CSF analysis was consistent with greater than 5 oligoclonal bands with normal IgG index, 3 nucleated cells, normal CSF protein.  B12 826; CRP/VIKTOR/ANCA and double-stranded DNA/Lyme disease/acute hepatitis panel all negative.  Mildly elevated ESR 18 with normal/negative rheumatoid factor.      Imaging were completed in outside hospital with no demyelination appreciated in cervical and thoracic region.  Patient brain MRI completed but not reviewed due to difficulties uploading CDs.  Patient has lots of artifacts on his brain MRIs due to BB gun products sitting in patient right pterygoid muscle which causing artifacts.  Main concern radiology was seen as demyelination.      Patient will be offered to follow-up with surgical team to remove metal particles from patient's face as and we will require repeating imaging studies documenting disease progression if any.     Considering patient clinical presentation and radiographic findings as well as CSF analysis, concern for patient developing multiple sclerosis very high  considering excellent recovery with Solu-Medrol infusions.       Meanwhile, patient is to consider healthy dietary approach and regular physical activity.  Review of Systems   Constitutional: Negative.    HENT: Negative.     Eyes: Negative.    Respiratory: Negative.     Cardiovascular: Negative.    Gastrointestinal: Negative.    Endocrine: Negative.    Genitourinary: Negative.    Musculoskeletal: Negative.    Skin: Negative.    Allergic/Immunologic: Negative.    Neurological: Negative.    Hematological: Negative.    Psychiatric/Behavioral: Negative.     All other systems reviewed and are negative.      Objective     There were no vitals taken for this visit.  Physical Exam    Visit Time  Total Visit Duration: 13 min

## 2025-07-09 ENCOUNTER — TELEPHONE (OUTPATIENT)
Age: 25
End: 2025-07-09

## 2025-07-09 DIAGNOSIS — T45.1X5A IMMUNODEFICIENCY DUE TO TREATMENT WITH IMMUNOSUPPRESSIVE MEDICATION (HCC): ICD-10-CM

## 2025-07-09 DIAGNOSIS — D84.821 IMMUNODEFICIENCY DUE TO TREATMENT WITH IMMUNOSUPPRESSIVE MEDICATION (HCC): ICD-10-CM

## 2025-07-09 DIAGNOSIS — Z11.59 ENCOUNTER FOR SCREENING FOR VIRAL DISEASE: ICD-10-CM

## 2025-07-09 DIAGNOSIS — Z11.1 TUBERCULOSIS SCREENING: ICD-10-CM

## 2025-07-09 DIAGNOSIS — Z79.60 IMMUNODEFICIENCY DUE TO TREATMENT WITH IMMUNOSUPPRESSIVE MEDICATION (HCC): ICD-10-CM

## 2025-07-09 DIAGNOSIS — G35 MULTIPLE SCLEROSIS (HCC): Primary | ICD-10-CM

## 2025-07-09 NOTE — TELEPHONE ENCOUNTER
Received call from Tracey at [facility]     They are asking if we can submit another PA for Ocrevus infusion since the one they have on file  25    Or if we have an active auth if we can fax to them.  Patient has appt       fax- [phone]  FATMATA Melendez    Thank you

## 2025-07-10 NOTE — TELEPHONE ENCOUNTER
Unfortunately our office was not made aware of prior infusion nor the upcoming infusion on 7/14/25 ahead of time.     Pt requires labs 7 days prior to each infusion. Last labs appear to have been completed 7/3/24. Lab orders entered.     MA has signed infusion orders and will monica that patient cannot proceed with treatment without lab results.     It appears updated PA is also needed. Pt has [facility] plan, PA requests previously managed by [facility].   P: [phone]  F: [phone]    Called [facility] and spoke with Rosalind, no active policy on file. Call transferred to [facility], spoke with Carlos. She confirmed pt's insurance termed as of 2/1/25.    Pt does not have neurology appt scheduled.    Called pt, made him aware of above.     LABS- will likely go to local lab, plans to print lab scripts from [employer]. Would like [employer] message sent with all scripts. Message sent.     INSURANCE- pt reports new insurance plan:  [facility]  ID # [id number]  Group # [id number]  Phone # [phone]    Updated coverage in chart. Addressing PA submission below.    APPT- Pt advised due to working swing shift and not being close to office, he would need a virtual visit if possible. He denies any current complaints. He accepted f/u appt with Fatuma on 7/16/25 (30 minutes, virtual). Please notify me if any adjustments are needed.     Pt is aware due to need to labs and PA, infusion on Monday will likely need to be postponed. Pt is agreeable.    PA SUBMISSION-  Called [facility] at [phone] and spoke with Ml. [phone] and [phone] do not require PA. Ocrevus  does require PA. Ml provided assistance in locating PA form online.     [facility]  NPI [id number]  Tax ID [id number]  Address  [address] [place] [address] Dr Aiyana REYES [id number]    PA form completed, faxed with clinicals. Marked urgent.     Will contact Tracey tomorrow AM to provide update.

## 2025-07-10 NOTE — TELEPHONE ENCOUNTER
Faxed and Scanned Signed [facility] Infusion Orders into pt media.

## 2025-07-11 NOTE — TELEPHONE ENCOUNTER
Isacc from [facility] called checking on the PA for Ocrevus.   Tracey stated that Mondays infursion appt was cancelled until PA information was retreived.    Once PA in obtained please fax info to Tracey at   [phone].   No No

## 2025-07-14 ENCOUNTER — TELEPHONE (OUTPATIENT)
Age: 25
End: 2025-07-14

## 2025-07-14 NOTE — TELEPHONE ENCOUNTER
Called patient requesting Insurance information to be uploaded to my chart for upcoming Virtual visit 7/16/25 with [facility]. Patient stated he would upload information as soon as possible.

## 2025-07-14 NOTE — TELEPHONE ENCOUNTER
Received a call from Adriana Holliday at the [facility]. They sent a fax with a template with orders to be filled out for Ocrevus, but received the fax back with no corrections or additional orders added on. Adriana wanted to confirm that pt should receive the orders as previously noted and also what his premeds would be. Adriana's direct CB# [phone].    Routed to the nurse navigator to assist.

## 2025-07-16 ENCOUNTER — TELEMEDICINE (OUTPATIENT)
Age: 25
End: 2025-07-16
Payer: COMMERCIAL

## 2025-07-16 DIAGNOSIS — G35 MULTIPLE SCLEROSIS (HCC): Primary | ICD-10-CM

## 2025-07-16 PROCEDURE — 99214 OFFICE O/P EST MOD 30 MIN: CPT

## 2025-07-16 NOTE — PROGRESS NOTES
Virtual Regular Visit  Name: Hunter Lee      : 2000      MRN: [id number]  Encounter Provider: MAIA Go  Encounter Date: 2025   Encounter department: [facility] [place]  :  Assessment & Plan  Multiple sclerosis (HCC)  Hunter Lee is a 24 year old male with multiple sclerosis (dx ) maintained on Ocrevus as his disease modifying therapy who was overdue for routine follow-up as well as surveillance labs prior to his next Ocrevus infusion which was due 2025.  Since being maintained on Ocrevus he reports no obvious disease progression.  He denies any new acute or worsening focal neurologic symptoms that would be concerning for acute MS relapse.  He tolerates Ocrevus with no infusion reactions or side effects.  He is planning to complete surveillance labs tomorrow and would like to proceed with his Ocrevus infusion within 10 days of that.  He is willing to complete updated MRI brain last completed 2 years ago to assess for disease stability; new orders placed today but will defer contrast given clinical stability.  He was advised he must follow-up every 6 months as well as have labs done 6 to 10 days prior to each Ocrevus infusion to be able to remain on therapy.  He will follow-up in 6 months via telemedicine with either myself or Dr. Méndez.    Orders:    MRI brain MS wo contrast; Future        History of Present Illness     HPI Hunter Lee is a 24 year old male with multiple sclerosis (dx ) maintained on Ocrevus as his disease modifying therapy who presents for follow up via telemedicine.     He was last seen 8/15/2024.  Unfortunately we were not made aware of his last Ocrevus infusion which was administered 2025.  He did not have required surveillance labs prior to this infusion.  He was scheduled for his next infusion 2025, however after the office became aware that he had not had a follow-up in almost 1 year and no  surveillance labs the infusion was canceled.  His insurance also required updated authorization.    He tells me today he tolerated his last infusion with no infusion reactions or side effects. He denies any recent infections or illness besides a step throat infection one month ago, this resolved with abx. He denies numbness, tingling, weakness, dizziness, headaches, vision changes, or changes in bowel or bladder.  He does take gabapentin as needed, but reports has not recently used this.  He has not completed updated MRI brain ordered at his visit 8/15/2024 due to insurance; but is agreeable to updating imaging now if reordered.  He is planning on going to complete surveillance labs for his next Ocrevus infusion tomorrow afternoon.    He denies any new acute or worsening focal neurologic symptoms that would be concerning for acute MS relapse.    Review of Systems  Constitutional:  Negative for appetite change, fatigue and fever.   HENT: Negative.  Negative for hearing loss, tinnitus, trouble swallowing and voice change.    Eyes: Negative.  Negative for photophobia, pain and visual disturbance.   Respiratory: Negative.  Negative for shortness of breath.    Cardiovascular: Negative.  Negative for palpitations.   Gastrointestinal: Negative.  Negative for nausea and vomiting.   Endocrine: Negative.  Negative for cold intolerance.   Genitourinary: Negative.  Negative for dysuria, frequency and urgency.   Musculoskeletal:  Negative for back pain, gait problem, myalgias, neck pain and neck stiffness.   Skin: Negative.  Negative for rash.   Allergic/Immunologic: Negative.    Neurological:  Negative for dizziness, tremors, seizures, syncope, facial asymmetry, speech difficulty, weakness, light-headedness, numbness and headaches.   Hematological: Negative.  Does not bruise/bleed easily.   Psychiatric/Behavioral: Negative.  Negative for confusion, hallucinations and sleep disturbance.    All other systems reviewed and are  negative.    I have personally reviewed the MA's review of systems and made changes as necessary.    Objective   There were no vitals taken for this visit.    Physical Exam  On neurological examination the patient was awake, alert, attentive, oriented to person, place, and time. Recent and remote memory intact to conversation with no evidence of language dysfunction. Satisfactory fund of knowledge. Normal attention span and concentration.  Mood, affect and judgement are appropriate. Speech is fluent without dysarthria or aphasia. Face appears symmetric, with no obvious weakness noted.  Audition is intact to casual conversation.     Administrative Statements   Encounter provider MAIA Go    The Patient is located at Home and in the following state in which I hold an active license [place].    The patient was identified by name and date of birth. Hunter Lee was informed that this is a telemedicine visit and that the visit is being conducted through the Epic Embedded platform. He agrees to proceed..  My office door was closed. No one else was in the room.  He acknowledged consent and understanding of privacy and security of the video platform. The patient has agreed to participate and understands they can discontinue the visit at any time.    I have spent a total time of 20 minutes in caring for this patient on the day of the visit/encounter including Diagnostic results, Prognosis, Risks and benefits of tx options, Instructions for management, Patient and family education, Importance of tx compliance, Risk factor reductions, Impressions, Counseling / Coordination of care, Documenting in the medical record, Reviewing/placing orders in the medical record (including tests, medications, and/or procedures), and Obtaining or reviewing history  , not including the time spent for establishing the audio/video connection.

## 2025-07-16 NOTE — PROGRESS NOTES
Name: Hunter Lee      : 2000      MRN: [id number]  Encounter Provider: MAIA Go  Encounter Date: 2025   Encounter department: [facility] [place]  :  Assessment & Plan      {Ambulatory Patient Instructions (Optional):49814}    History of Present Illness {?Quick Links Encounters * My Last Note * Last Note in Specialty * Snapshot * Since Last Visit * History :62895}  HPI   Review of Systems   Constitutional:  Negative for appetite change, fatigue and fever.   HENT: Negative.  Negative for hearing loss, tinnitus, trouble swallowing and voice change.    Eyes: Negative.  Negative for photophobia, pain and visual disturbance.   Respiratory: Negative.  Negative for shortness of breath.    Cardiovascular: Negative.  Negative for palpitations.   Gastrointestinal: Negative.  Negative for nausea and vomiting.   Endocrine: Negative.  Negative for cold intolerance.   Genitourinary: Negative.  Negative for dysuria, frequency and urgency.   Musculoskeletal:  Negative for back pain, gait problem, myalgias, neck pain and neck stiffness.   Skin: Negative.  Negative for rash.   Allergic/Immunologic: Negative.    Neurological:  Negative for dizziness, tremors, seizures, syncope, facial asymmetry, speech difficulty, weakness, light-headedness, numbness and headaches.   Hematological: Negative.  Does not bruise/bleed easily.   Psychiatric/Behavioral: Negative.  Negative for confusion, hallucinations and sleep disturbance.    All other systems reviewed and are negative.   I have personally reviewed the MA's review of systems and made changes as necessary.    {Select to insert medical history sections (Optional):81939}     Objective {?Quick Links Trend Vitals * Enter New Vitals * Results Review * Timeline (Adult) * Labs * Imaging * Cardiology * Procedures * Lung Cancer Screening * Surgical eConsent :01328}  There were no vitals taken for this visit.    Physical Exam  Neurological  Exam    {Radiology Results Review (Optional):20623}    {Administrative / Billing Section (Optional):90917}

## 2025-07-16 NOTE — ASSESSMENT & PLAN NOTE
Hunter Lee is a 24 year old male with multiple sclerosis (dx 2023) maintained on Ocrevus as his disease modifying therapy who was overdue for routine follow-up as well as surveillance labs prior to his next Ocrevus infusion which was due 7/14/2025.  Since being maintained on Ocrevus he reports no obvious disease progression.  He denies any new acute or worsening focal neurologic symptoms that would be concerning for acute MS relapse.  He tolerates Ocrevus with no infusion reactions or side effects.  He is planning to complete surveillance labs tomorrow and would like to proceed with his Ocrevus infusion within 10 days of that.  He is willing to complete updated MRI brain last completed 2 years ago to assess for disease stability; new orders placed today but will defer contrast given clinical stability.  He was advised he must follow-up every 6 months as well as have labs done 6 to 10 days prior to each Ocrevus infusion to be able to remain on therapy.  He will follow-up in 6 months via telemedicine with either myself or Dr. Méndez.    Orders:    MRI brain MS wo contrast; Future

## 2025-07-24 ENCOUNTER — TELEPHONE (OUTPATIENT)
Dept: NEUROLOGY | Facility: CLINIC | Age: 25
End: 2025-07-24

## 2025-07-24 ENCOUNTER — RESULTS FOLLOW-UP (OUTPATIENT)
Dept: OTHER | Facility: HOSPITAL | Age: 25
End: 2025-07-24

## 2025-07-24 NOTE — TELEPHONE ENCOUNTER
Called and LMOM (okay per communication form). Provided office number to call back with any questions. EMOSpeech message sent with information as well.

## 2025-08-06 ENCOUNTER — TELEPHONE (OUTPATIENT)
Dept: NEUROLOGY | Facility: CLINIC | Age: 25
End: 2025-08-06

## 2025-08-14 ENCOUNTER — TELEPHONE (OUTPATIENT)
Age: 25
End: 2025-08-14